# Patient Record
Sex: FEMALE | Race: WHITE
[De-identification: names, ages, dates, MRNs, and addresses within clinical notes are randomized per-mention and may not be internally consistent; named-entity substitution may affect disease eponyms.]

---

## 2019-04-30 PROBLEM — Z00.00 ENCOUNTER FOR PREVENTIVE HEALTH EXAMINATION: Status: ACTIVE | Noted: 2019-04-30

## 2019-05-08 ENCOUNTER — APPOINTMENT (OUTPATIENT)
Dept: UROGYNECOLOGY | Facility: CLINIC | Age: 79
End: 2019-05-08
Payer: MEDICARE

## 2019-05-08 VITALS
SYSTOLIC BLOOD PRESSURE: 150 MMHG | DIASTOLIC BLOOD PRESSURE: 86 MMHG | BODY MASS INDEX: 31.58 KG/M2 | WEIGHT: 185 LBS | HEIGHT: 64 IN

## 2019-05-08 DIAGNOSIS — Z87.828 PERSONAL HISTORY OF OTHER (HEALED) PHYSICAL INJURY AND TRAUMA: ICD-10-CM

## 2019-05-08 DIAGNOSIS — N95.2 POSTMENOPAUSAL ATROPHIC VAGINITIS: ICD-10-CM

## 2019-05-08 DIAGNOSIS — Z83.3 FAMILY HISTORY OF DIABETES MELLITUS: ICD-10-CM

## 2019-05-08 DIAGNOSIS — M31.6 OTHER GIANT CELL ARTERITIS: ICD-10-CM

## 2019-05-08 DIAGNOSIS — R39.11 HESITANCY OF MICTURITION: ICD-10-CM

## 2019-05-08 DIAGNOSIS — Z83.49 FAMILY HISTORY OF OTHER ENDOCRINE, NUTRITIONAL AND METABOLIC DISEASES: ICD-10-CM

## 2019-05-08 DIAGNOSIS — R35.0 FREQUENCY OF MICTURITION: ICD-10-CM

## 2019-05-08 LAB
BILIRUB UR QL STRIP: NORMAL
CLARITY UR: CLEAR
COLLECTION METHOD: NORMAL
GLUCOSE UR-MCNC: NORMAL
HCG UR QL: 0.2 EU/DL
HGB UR QL STRIP.AUTO: NORMAL
KETONES UR-MCNC: NORMAL
LEUKOCYTE ESTERASE UR QL STRIP: NORMAL
NITRITE UR QL STRIP: NORMAL
PH UR STRIP: 5
PROT UR STRIP-MCNC: NORMAL
SP GR UR STRIP: 1.01

## 2019-05-08 PROCEDURE — 81003 URINALYSIS AUTO W/O SCOPE: CPT | Mod: QW

## 2019-05-08 PROCEDURE — 99204 OFFICE O/P NEW MOD 45 MIN: CPT | Mod: 25

## 2019-05-08 PROCEDURE — 51741 ELECTRO-UROFLOWMETRY FIRST: CPT

## 2019-05-08 PROCEDURE — 51725 SIMPLE CYSTOMETROGRAM: CPT

## 2019-05-08 RX ORDER — HYDROCHLOROTHIAZIDE 25 MG/1
25 TABLET ORAL
Qty: 90 | Refills: 0 | Status: ACTIVE | COMMUNITY
Start: 2018-10-29

## 2019-05-08 RX ORDER — ATENOLOL 25 MG/1
25 TABLET ORAL
Qty: 90 | Refills: 0 | Status: ACTIVE | COMMUNITY
Start: 2018-11-05

## 2019-05-08 RX ORDER — GABAPENTIN 100 MG/1
100 CAPSULE ORAL
Qty: 180 | Refills: 0 | Status: ACTIVE | COMMUNITY
Start: 2019-01-29

## 2019-05-08 RX ORDER — LEVOTHYROXINE SODIUM 0.03 MG/1
25 TABLET ORAL
Qty: 90 | Refills: 0 | Status: ACTIVE | COMMUNITY
Start: 2019-01-04

## 2019-05-08 NOTE — ASSESSMENT
[FreeTextEntry1] : 78 year old s/p marisabel abd sacral suspension with rectocele 2016 (Eduarda) with defecatory dysfunction, frequency, hesitancy, prolapse symptoms.  Defecography positive for rectal prolapse. \par \par Exam c/w large posterior defect and peroneal mobility.\par \par Co-morbidities: Hx pulmonary emboli  multiple with kristen filter (when on prempro ), age, irreg hearrate since 32 year old on atenalol.   NO DM, HTN, other major illnesses. \par \par Has had 2 episodes in  year when prolapse is stuck outside and severe symptoms, each time reduced on its own in 3 days\par \par Size of defect likely would require combined rectopexy/ repeat vaginal/ posterior wall surgery.  ? defec dysfunction from existing rectocele and prolapse with or without contribution from adhesions and likely sacral suspension above the rectum. \par \par Recommend eval with Dr Marques, review actual disc from  defecography and together advise patient.  Combined surgery would be extensive and embolic risk certainly high for this patient.\par \par record request 2006 surgery Eduarda and patient to bring defecography disk for Jerald consult

## 2019-05-08 NOTE — HISTORY OF PRESENT ILLNESS
[Uterine Prolapse] : frequent [Rectal Prolapse] : frequent [Unable To Restrain Bowel Movement] : frequent [Constipation Obstructed Defecation] : frequent [Urinary Tract Infection] : frequent [] : months ago [FreeTextEntry1] : 78 year old s/p marisabel abd sacral suspension with [id number] (Eduarda) with defecatory dysfunction, frequency, hesitancy, prolapse symptoms.  Defecography positive for rectal prolapse.

## 2019-05-23 ENCOUNTER — APPOINTMENT (OUTPATIENT)
Dept: SURGERY | Facility: CLINIC | Age: 79
End: 2019-05-23
Payer: MEDICARE

## 2019-05-23 VITALS
RESPIRATION RATE: 16 BRPM | TEMPERATURE: 98.6 F | HEIGHT: 64.5 IN | SYSTOLIC BLOOD PRESSURE: 147 MMHG | BODY MASS INDEX: 31.2 KG/M2 | WEIGHT: 185 LBS | HEART RATE: 80 BPM | OXYGEN SATURATION: 98 % | DIASTOLIC BLOOD PRESSURE: 90 MMHG

## 2019-05-23 DIAGNOSIS — Z95.828 PRESENCE OF OTHER VASCULAR IMPLANTS AND GRAFTS: ICD-10-CM

## 2019-05-23 DIAGNOSIS — Z87.19 PERSONAL HISTORY OF OTHER DISEASES OF THE DIGESTIVE SYSTEM: ICD-10-CM

## 2019-05-23 DIAGNOSIS — Z82.49 FAMILY HISTORY OF ISCHEMIC HEART DISEASE AND OTHER DISEASES OF THE CIRCULATORY SYSTEM: ICD-10-CM

## 2019-05-23 DIAGNOSIS — I49.9 CARDIAC ARRHYTHMIA, UNSPECIFIED: ICD-10-CM

## 2019-05-23 DIAGNOSIS — Z87.891 PERSONAL HISTORY OF NICOTINE DEPENDENCE: ICD-10-CM

## 2019-05-23 DIAGNOSIS — Z86.718 PERSONAL HISTORY OF OTHER VENOUS THROMBOSIS AND EMBOLISM: ICD-10-CM

## 2019-05-23 DIAGNOSIS — Z86.711 PERSONAL HISTORY OF PULMONARY EMBOLISM: ICD-10-CM

## 2019-05-23 DIAGNOSIS — R15.9 FULL INCONTINENCE OF FECES: ICD-10-CM

## 2019-05-23 PROCEDURE — 45300 PROCTOSIGMOIDOSCOPY DX: CPT

## 2019-05-23 PROCEDURE — 99204 OFFICE O/P NEW MOD 45 MIN: CPT

## 2019-05-23 RX ORDER — CHOLECALCIFEROL (VITAMIN D3) 25 MCG
TABLET,CHEWABLE ORAL
Refills: 0 | Status: ACTIVE | COMMUNITY

## 2019-05-23 RX ORDER — ASPIRIN 81 MG
81 TABLET, DELAYED RELEASE (ENTERIC COATED) ORAL
Refills: 0 | Status: ACTIVE | COMMUNITY

## 2019-05-23 NOTE — ASSESSMENT
[FreeTextEntry1] : I have seen and evaluated patient and I have corroborated all nursing input into this note. Patient with symptoms of obstructed defecation and has rectal prolapse to the anal verge. She has no history of prior constipation. She has episodes of anal incontinence. She has recurrent anterior pelvic floor prolapse as well. Since her symptom is primarily obstructed defecation and she has no prior history of constipation I recommended ventral mesh rectopexy. This is an ideal procedure to be performed in conjunction with repeat anterior pelvic floor repair as a combined procedure with urogynecology. Patient's previous DVT and PE were related to hormone therapy. Patient has a Savage filter in place. It does not appear that she is at any increased risk at this time. Patient will followup with urogynecology and the final decision will be made about proceeding with surgery. The risk of incarceration and progressive anal incontinence if no surgery performed was discussed. The indications, risks, benefits, alternatives for ventral mesh rectopexy also reviewed including but not limited to bleeding, infection, recurrence, and persistent anal incontinence. All questions answered.

## 2019-05-23 NOTE — PHYSICAL EXAM
[Normal Breath Sounds] : Normal breath sounds [Normal Heart Sounds] : normal heart sounds [Normal Rate and Rhythm] : normal rate and rhythm [No Rash or Lesion] : No rash or lesion [Alert] : alert [Oriented to Person] : oriented to person [Oriented to Place] : oriented to place [Oriented to Time] : oriented to time [Anxious] : anxious [Abdomen Masses] : No abdominal masses [Abdomen Tenderness] : ~T No ~M abdominal tenderness [JVD] : no jugular venous distention  [Wheezing] : no wheezing was heard [de-identified] : Appears well nourished [de-identified] : WNL [de-identified] : Full ROM [FreeTextEntry1] : Perianal inspection during Valsalva on the commode demonstrated rectal prolapse to the anal verge. On the examining table poor resting and squeeze tones were noted on digital exam. Valsalva during rigid sigmoidoscopy again confirmed rectal prolapse to the anal verge.

## 2019-05-23 NOTE — CONSULT LETTER
[Dear  ___] : Dear ~SHANTEL, [Courtesy Letter:] : I had the pleasure of seeing your patient, [unfilled], in my office today. [Please see my note below.] : Please see my note below. [Consult Closing:] : Thank you very much for allowing me to participate in the care of this patient.  If you have any questions, please do not hesitate to contact me. [Sincerely,] : Sincerely, [FreeTextEntry2] : Dr. Larry Jacob [FreeTextEntry3] : Lorenzo Marques M.D., SHAWANDA.DIXON., F.[email or web address].[email or web address].\[facility] Chief Colorectal Clinical Services, [facility]

## 2019-06-13 ENCOUNTER — APPOINTMENT (OUTPATIENT)
Dept: UROGYNECOLOGY | Facility: CLINIC | Age: 79
End: 2019-06-13
Payer: MEDICARE

## 2019-06-13 DIAGNOSIS — K59.00 CONSTIPATION, UNSPECIFIED: ICD-10-CM

## 2019-06-13 DIAGNOSIS — K62.3 RECTAL PROLAPSE: ICD-10-CM

## 2019-06-13 PROCEDURE — 99214 OFFICE O/P EST MOD 30 MIN: CPT

## 2019-06-13 NOTE — HISTORY OF PRESENT ILLNESS
[FreeTextEntry1] : 80-year-old patient with a hih Dr. Recinos has definite fannie dysfunction and confirmed rectal prolapse. She had a recent consult withdayana who reconfirmed of the rectal prolapse. She has had 2 incidences of the re painful an was reduced If the frequency of externalized rectal prolapse and pain can be minimized herference at this time is to stay away from extensive surgery. She has a history of pulmonary embolism in one year after coming off of oral estrogen replacement and has a [employer] Cloud filter in place.\par \par I recommended pelvic floor strengthening with rectal strengthening to reduce the incidence of externalized rectal prolapse and given her a prescription for physical therapy and Mitra.\par \par The nature of a combined procedure was reviewed and it is unclear whether there would need to be an anterior component of the surgery but I would likely be there to assess that with Dr. Marques at the time of surgery. Extensive counseling regarding management of rectal prolapse and expectations of surgery if she decided to go forward was the dominant component of this encounter\par \par Rx provided for timbo Manriquez.

## 2021-07-30 ENCOUNTER — APPOINTMENT (OUTPATIENT)
Dept: OPHTHALMOLOGY | Facility: CLINIC | Age: 81
End: 2021-07-30
Payer: MEDICARE

## 2021-07-30 ENCOUNTER — NON-APPOINTMENT (OUTPATIENT)
Age: 81
End: 2021-07-30

## 2021-07-30 PROCEDURE — 92014 COMPRE OPH EXAM EST PT 1/>: CPT

## 2022-01-28 ENCOUNTER — APPOINTMENT (OUTPATIENT)
Dept: OPHTHALMOLOGY | Facility: CLINIC | Age: 82
End: 2022-01-28
Payer: MEDICARE

## 2022-01-28 ENCOUNTER — NON-APPOINTMENT (OUTPATIENT)
Age: 82
End: 2022-01-28

## 2022-01-28 PROCEDURE — 92134 CPTRZ OPH DX IMG PST SGM RTA: CPT

## 2022-01-28 PROCEDURE — 92014 COMPRE OPH EXAM EST PT 1/>: CPT

## 2022-05-22 NOTE — HISTORY OF PRESENT ILLNESS
[FreeTextEntry1] : Teena is a 78 y/o female here for evaluation of possible rectal prolapse. \par Virtual colonoscopy 11/16 demonstrates small amount of reflux of air into the small intestine. Entire colon was well filled. Scattered areas of residual tagged fecal material and small amount of residual fluid throughout the colon which slightly limits evaluation. There is sigmoid diverticulosis with wall thickening, compatible with muscular hypertrophy. No evidence of colonic polyp of mass. \par MR defecography demonstrates fecal incontinence at rest and under slight Valsalva indicating incompetent internal and external anal sphincter. Global pelvic floor weakness with tricompartmental organ prolapse, worse posteriorly with anorectal descent, rectal mucosal prolapse and posterior wall intussusception into the anal canal, which likely would progress into full-thickness rectal prolapse if in upright position. \par Peritoneocele, worse anteriorly bulging the perineum. \par Mild-moderate bladder/vagina prolapse with cystocele mild urethral hypermobility, no urinary incontinence. \par Decent puborectalis sling contraction during Kegal maneuver, uplifting the anorectum and partially closing upon the anal canal. \par \par Patient reports 2 episodes in the past year, where she felt her rectum prolapsing down when straining with a BM. Rectum reduces spontaneously. Following the prolapse pt experiences small, frequent BMs for about 2 days. Pt was taking Metamucil daily and 1/2 Miralax daily but now only Miralax. \par Pt occasionally pushes on buttocks to eliminate stool that she feels "stuck in the rectum". \par \par Patient had previous vaginal suspension. Patient with history of DVT and bilateral PE because of hormone replacement. Patient has Gould filter in place. Patient with no prior history of constipation. Currently has symptoms of obstructive defecation with incomplete evacuation.\par \par \par 
normal...

## 2022-08-12 ENCOUNTER — APPOINTMENT (OUTPATIENT)
Dept: OPHTHALMOLOGY | Facility: CLINIC | Age: 82
End: 2022-08-12

## 2022-08-12 ENCOUNTER — NON-APPOINTMENT (OUTPATIENT)
Age: 82
End: 2022-08-12

## 2022-08-12 PROCEDURE — 92134 CPTRZ OPH DX IMG PST SGM RTA: CPT

## 2022-08-12 PROCEDURE — 92014 COMPRE OPH EXAM EST PT 1/>: CPT

## 2023-02-10 ENCOUNTER — NON-APPOINTMENT (OUTPATIENT)
Age: 83
End: 2023-02-10

## 2023-02-10 ENCOUNTER — APPOINTMENT (OUTPATIENT)
Dept: OPHTHALMOLOGY | Facility: CLINIC | Age: 83
End: 2023-02-10
Payer: MEDICARE

## 2023-02-10 PROCEDURE — 92250 FUNDUS PHOTOGRAPHY W/I&R: CPT

## 2023-02-10 PROCEDURE — 92014 COMPRE OPH EXAM EST PT 1/>: CPT

## 2023-08-11 ENCOUNTER — APPOINTMENT (OUTPATIENT)
Dept: OPHTHALMOLOGY | Facility: CLINIC | Age: 83
End: 2023-08-11
Payer: MEDICARE

## 2023-08-11 ENCOUNTER — NON-APPOINTMENT (OUTPATIENT)
Age: 83
End: 2023-08-11

## 2023-08-11 PROCEDURE — 92014 COMPRE OPH EXAM EST PT 1/>: CPT

## 2023-08-11 PROCEDURE — 92134 CPTRZ OPH DX IMG PST SGM RTA: CPT

## 2023-08-21 ENCOUNTER — NON-APPOINTMENT (OUTPATIENT)
Age: 83
End: 2023-08-21

## 2023-08-21 ENCOUNTER — APPOINTMENT (OUTPATIENT)
Dept: OPHTHALMOLOGY | Facility: CLINIC | Age: 83
End: 2023-08-21
Payer: MEDICARE

## 2023-08-21 PROCEDURE — 99213 OFFICE O/P EST LOW 20 MIN: CPT

## 2024-02-16 ENCOUNTER — NON-APPOINTMENT (OUTPATIENT)
Age: 84
End: 2024-02-16

## 2024-02-16 ENCOUNTER — APPOINTMENT (OUTPATIENT)
Dept: OPHTHALMOLOGY | Facility: CLINIC | Age: 84
End: 2024-02-16
Payer: MEDICARE

## 2024-02-16 PROCEDURE — 92250 FUNDUS PHOTOGRAPHY W/I&R: CPT

## 2024-02-16 PROCEDURE — 92014 COMPRE OPH EXAM EST PT 1/>: CPT

## 2024-08-15 ENCOUNTER — APPOINTMENT (OUTPATIENT)
Dept: OPHTHALMOLOGY | Facility: CLINIC | Age: 84
End: 2024-08-15
Payer: MEDICARE

## 2024-08-15 ENCOUNTER — NON-APPOINTMENT (OUTPATIENT)
Age: 84
End: 2024-08-15

## 2024-08-15 PROCEDURE — 92134 CPTRZ OPH DX IMG PST SGM RTA: CPT

## 2024-08-15 PROCEDURE — 92014 COMPRE OPH EXAM EST PT 1/>: CPT

## 2024-09-11 ENCOUNTER — APPOINTMENT (OUTPATIENT)
Dept: VASCULAR SURGERY | Facility: CLINIC | Age: 84
End: 2024-09-11
Payer: MEDICARE

## 2024-09-11 VITALS
HEIGHT: 64.5 IN | SYSTOLIC BLOOD PRESSURE: 142 MMHG | RESPIRATION RATE: 14 BRPM | WEIGHT: 170 LBS | HEART RATE: 75 BPM | OXYGEN SATURATION: 97 % | BODY MASS INDEX: 28.67 KG/M2 | DIASTOLIC BLOOD PRESSURE: 72 MMHG

## 2024-09-11 DIAGNOSIS — I87.8 OTHER SPECIFIED DISORDERS OF VEINS: ICD-10-CM

## 2024-09-11 PROCEDURE — 93971 EXTREMITY STUDY: CPT

## 2024-09-11 PROCEDURE — 99203 OFFICE O/P NEW LOW 30 MIN: CPT

## 2024-09-11 RX ORDER — CLOBETASOL PROPIONATE 0.5 MG/G
0.05 CREAM TOPICAL TWICE DAILY
Qty: 1 | Refills: 1 | Status: ACTIVE | COMMUNITY
Start: 2024-09-11 | End: 1900-01-01

## 2024-09-11 NOTE — HISTORY OF PRESENT ILLNESS
[FreeTextEntry1] : 85 yo female PMHx HTN, giant cell arteritis, presents for evaluation of right medial ankle rash. She states about 3 weeks noticed a right medial ankle rash. She states in 2006 she had a RLE DVT and bilateral pulmonary embolism. She was treated with AC (cannot remember name). She also had an IVC filter for 1 year which she had removed. She has been using 20-30 mmHg knee high compression stockings for over 12 years.

## 2024-09-11 NOTE — PROCEDURE
[FreeTextEntry1] : 9/11/24 RLE venous duplex: neg for acute DVT, non occlusive post thrombotic luminal changes in the mid/distal FV and Pop V with reflux

## 2024-09-11 NOTE — PHYSICAL EXAM
[2+] : left 2+ [Ankle Swelling (On Exam)] : present [Ankle Swelling Bilaterally] : bilaterally  [Ankle Swelling On The Left] : moderate [FreeTextEntry1] : right medial ankle erythema and scaling skin  [de-identified] : NAD. well appearing

## 2024-09-11 NOTE — ASSESSMENT
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 Pt has right medial ankle venous stasis rash.   Pt counseled on results of duplex and above diagnosis. RLE UNNA boot applied  Pt given rx for clobetasol cream for rash  Will refer to tactile for lymphedema massage pump  Pt counseled to walk daily and elevate legs at night RTC in 1 week to monitor symptoms   A total of 30 minutes was spent with patient and coordinating care

## 2024-09-18 ENCOUNTER — APPOINTMENT (OUTPATIENT)
Dept: VASCULAR SURGERY | Facility: CLINIC | Age: 84
End: 2024-09-18
Payer: MEDICARE

## 2024-09-18 VITALS
SYSTOLIC BLOOD PRESSURE: 132 MMHG | BODY MASS INDEX: 27.99 KG/M2 | OXYGEN SATURATION: 96 % | DIASTOLIC BLOOD PRESSURE: 70 MMHG | WEIGHT: 168 LBS | HEIGHT: 65 IN | HEART RATE: 75 BPM

## 2024-09-18 DIAGNOSIS — R60.0 LOCALIZED EDEMA: ICD-10-CM

## 2024-09-18 PROCEDURE — 99213 OFFICE O/P EST LOW 20 MIN: CPT

## 2024-09-18 NOTE — ASSESSMENT
[FreeTextEntry1] : 83 yo female with post thrombotic syndrome of RLE. Pt has right medial ankle venous stasis rash.   Pt counseled on results of duplex and above diagnosis. Pt to apply clobetasol cream for rash twice daily  Counseled to use 20-30 or 30-40 mmHg compression stockings  Will refer to tactile for lymphedema massage pump  Pt counseled to walk daily and elevate legs at night RTC in 3 months to monitor symptoms   A total of 30 minutes was spent with patient and coordinating care

## 2024-09-18 NOTE — HISTORY OF PRESENT ILLNESS
[FreeTextEntry1] : 9/11/23: 83 yo female PMHx HTN, giant cell arteritis, presents for evaluation of right medial ankle rash. She states about 3 weeks noticed a right medial ankle rash. She states in 2006 she had a RLE DVT and bilateral pulmonary embolism. She was treated with AC (cannot remember name). She also had an IVC filter for 1 year which she had removed. She has been using 20-30 mmHg knee high compression stockings for over 12 years.   9/18/24: Pt had RLE UNNA boot on for the past week. She felt the UNNA boot was too uncomfortable. She does not have lymphedema massage pump yet.

## 2024-09-25 RX ORDER — GABAPENTIN 300 MG/1
300 CAPSULE ORAL
Qty: 90 | Refills: 3 | Status: ACTIVE | COMMUNITY
Start: 2024-09-25 | End: 1900-01-01

## 2024-12-18 ENCOUNTER — APPOINTMENT (OUTPATIENT)
Dept: VASCULAR SURGERY | Facility: CLINIC | Age: 84
End: 2024-12-18
Payer: MEDICARE

## 2024-12-18 VITALS
BODY MASS INDEX: 28.32 KG/M2 | RESPIRATION RATE: 14 BRPM | SYSTOLIC BLOOD PRESSURE: 118 MMHG | OXYGEN SATURATION: 96 % | DIASTOLIC BLOOD PRESSURE: 80 MMHG | WEIGHT: 170 LBS | HEART RATE: 72 BPM | HEIGHT: 65 IN

## 2024-12-18 DIAGNOSIS — I87.8 OTHER SPECIFIED DISORDERS OF VEINS: ICD-10-CM

## 2024-12-18 PROCEDURE — 99213 OFFICE O/P EST LOW 20 MIN: CPT

## 2024-12-18 RX ORDER — CLOBETASOL PROPIONATE 0.5 MG/G
0.05 CREAM TOPICAL TWICE DAILY
Qty: 1 | Refills: 0 | Status: ACTIVE | COMMUNITY
Start: 2024-12-18 | End: 1900-01-01

## 2025-03-19 ENCOUNTER — APPOINTMENT (OUTPATIENT)
Dept: VASCULAR SURGERY | Facility: CLINIC | Age: 85
End: 2025-03-19
Payer: MEDICARE

## 2025-03-19 VITALS
BODY MASS INDEX: 28.32 KG/M2 | WEIGHT: 170 LBS | RESPIRATION RATE: 14 BRPM | OXYGEN SATURATION: 96 % | DIASTOLIC BLOOD PRESSURE: 80 MMHG | HEIGHT: 65 IN | SYSTOLIC BLOOD PRESSURE: 122 MMHG | HEART RATE: 75 BPM

## 2025-03-19 DIAGNOSIS — R60.0 LOCALIZED EDEMA: ICD-10-CM

## 2025-03-19 PROCEDURE — 99213 OFFICE O/P EST LOW 20 MIN: CPT

## 2025-03-19 PROCEDURE — 93971 EXTREMITY STUDY: CPT

## 2025-03-19 RX ORDER — ATORVASTATIN CALCIUM 10 MG/1
10 TABLET, FILM COATED ORAL
Refills: 0 | Status: ACTIVE | COMMUNITY

## 2025-03-20 ENCOUNTER — APPOINTMENT (OUTPATIENT)
Dept: OPHTHALMOLOGY | Facility: CLINIC | Age: 85
End: 2025-03-20

## 2025-04-03 ENCOUNTER — APPOINTMENT (OUTPATIENT)
Dept: OPHTHALMOLOGY | Facility: CLINIC | Age: 85
End: 2025-04-03
Payer: MEDICARE

## 2025-04-03 ENCOUNTER — NON-APPOINTMENT (OUTPATIENT)
Age: 85
End: 2025-04-03

## 2025-04-03 PROCEDURE — 92014 COMPRE OPH EXAM EST PT 1/>: CPT

## 2025-04-03 PROCEDURE — 92250 FUNDUS PHOTOGRAPHY W/I&R: CPT

## 2025-04-18 NOTE — PHYSICAL EXAM
[2+] : left 2+ [Ankle Swelling (On Exam)] : present [Ankle Swelling Bilaterally] : bilaterally  [Ankle Swelling On The Left] : moderate [de-identified] : NAD. well appearing  [FreeTextEntry1] : right medial ankle erythema and scaling skin  0

## 2025-09-10 ENCOUNTER — APPOINTMENT (OUTPATIENT)
Dept: VASCULAR SURGERY | Facility: CLINIC | Age: 85
End: 2025-09-10
Payer: MEDICARE

## 2025-09-10 VITALS
BODY MASS INDEX: 28.32 KG/M2 | HEIGHT: 65 IN | HEART RATE: 75 BPM | SYSTOLIC BLOOD PRESSURE: 128 MMHG | WEIGHT: 170 LBS | DIASTOLIC BLOOD PRESSURE: 70 MMHG | OXYGEN SATURATION: 98 %

## 2025-09-10 DIAGNOSIS — I87.8 OTHER SPECIFIED DISORDERS OF VEINS: ICD-10-CM

## 2025-09-10 PROCEDURE — 99213 OFFICE O/P EST LOW 20 MIN: CPT

## 2025-09-10 PROCEDURE — 93971 EXTREMITY STUDY: CPT
